# Patient Record
Sex: FEMALE | Race: WHITE | Employment: OTHER | URBAN - METROPOLITAN AREA
[De-identification: names, ages, dates, MRNs, and addresses within clinical notes are randomized per-mention and may not be internally consistent; named-entity substitution may affect disease eponyms.]

---

## 2017-04-17 ENCOUNTER — LAB ENCOUNTER (OUTPATIENT)
Dept: LAB | Facility: HOSPITAL | Age: 72
End: 2017-04-17
Attending: INTERNAL MEDICINE
Payer: MEDICARE

## 2017-04-17 DIAGNOSIS — G47.33 OBSTRUCTIVE SLEEP APNEA (ADULT) (PEDIATRIC): ICD-10-CM

## 2017-04-17 DIAGNOSIS — J44.9 OBSTRUCTIVE CHRONIC BRONCHITIS WITHOUT EXACERBATION (HCC): ICD-10-CM

## 2017-04-17 DIAGNOSIS — E66.2 PICKWICKIAN SYNDROME (HCC): Primary | ICD-10-CM

## 2017-04-17 PROCEDURE — 82375 ASSAY CARBOXYHB QUANT: CPT

## 2017-04-17 PROCEDURE — 83050 HGB METHEMOGLOBIN QUAN: CPT

## 2017-04-17 PROCEDURE — 85018 HEMOGLOBIN: CPT

## 2017-04-17 PROCEDURE — 36415 COLL VENOUS BLD VENIPUNCTURE: CPT

## 2017-04-17 PROCEDURE — 82803 BLOOD GASES ANY COMBINATION: CPT

## 2017-04-17 PROCEDURE — 36600 WITHDRAWAL OF ARTERIAL BLOOD: CPT

## 2017-04-18 ENCOUNTER — LABORATORY ENCOUNTER (OUTPATIENT)
Dept: LAB | Age: 72
End: 2017-04-18
Attending: COLON & RECTAL SURGERY
Payer: MEDICARE

## 2017-04-18 DIAGNOSIS — K63.5 COLON POLYP: Primary | ICD-10-CM

## 2017-04-18 PROCEDURE — 88305 TISSUE EXAM BY PATHOLOGIST: CPT

## 2017-06-15 ENCOUNTER — PATIENT OUTREACH (OUTPATIENT)
Dept: FAMILY MEDICINE CLINIC | Facility: CLINIC | Age: 72
End: 2017-06-15

## 2018-05-14 ENCOUNTER — TELEPHONE (OUTPATIENT)
Dept: SURGERY | Facility: CLINIC | Age: 73
End: 2018-05-14

## 2018-05-14 NOTE — TELEPHONE ENCOUNTER
Patient calling states lost sheet with colonoscopy instructions and is inquiring on what to eat. Has procedure tomorrow. Sheet read w/ patient over phone call. Patient verb understanding.

## 2018-05-15 PROCEDURE — 88305 TISSUE EXAM BY PATHOLOGIST: CPT | Performed by: COLON & RECTAL SURGERY

## 2018-05-16 ENCOUNTER — LAB REQUISITION (OUTPATIENT)
Dept: LAB | Facility: HOSPITAL | Age: 73
End: 2018-05-16
Attending: COLON & RECTAL SURGERY
Payer: MEDICARE

## 2018-05-16 DIAGNOSIS — Z86.010 HISTORY OF COLONIC POLYPS: ICD-10-CM

## 2018-05-22 ENCOUNTER — TELEPHONE (OUTPATIENT)
Dept: SURGERY | Facility: CLINIC | Age: 73
End: 2018-05-22

## 2018-05-22 NOTE — TELEPHONE ENCOUNTER
----- Message from Tu Mcintosh MD sent at 5/21/2018  9:51 PM CDT -----  The patient had a benign polyp. It is the type that normally needs a follow up colonoscopy in 3 years.   Check the chart and colonoscopy results sheet to see if they have been reques

## 2018-05-22 NOTE — PROGRESS NOTES
Pt called office for path report. Results given. Questions encouraged, reassurance given. Follow up in 3years.

## 2018-05-22 NOTE — TELEPHONE ENCOUNTER
Pt called for test results. Given per dr. Juana Love. Follow up colonoscopy in 3 years. Pt states understanding.

## 2018-05-24 ENCOUNTER — TELEPHONE (OUTPATIENT)
Dept: SURGERY | Facility: CLINIC | Age: 73
End: 2018-05-24

## 2018-05-24 NOTE — TELEPHONE ENCOUNTER
Spoke to patient regarding colonoscopy and biopsy done on 5/15/18 with Dr. Elpidio Titus. Notified patient of results and colonoscopy recall put in system for 2 years---5/2020 per Dr. Elpidio Titus operative note.